# Patient Record
Sex: FEMALE | Race: WHITE | NOT HISPANIC OR LATINO | URBAN - METROPOLITAN AREA
[De-identification: names, ages, dates, MRNs, and addresses within clinical notes are randomized per-mention and may not be internally consistent; named-entity substitution may affect disease eponyms.]

---

## 2024-04-03 ENCOUNTER — TELEPHONE (OUTPATIENT)
Dept: HEPATOLOGY | Facility: CLINIC | Age: 74
End: 2024-04-03

## 2024-04-03 NOTE — TELEPHONE ENCOUNTER
Contacted patient regarding external referral received from MARLEEN Coleman re: alcoholic cirrhosis of liver.     Patient declined scheduling an appointment at this time. She states that this is not something she is ready to move forward with.     Explained to patient reason provider felt it was important for her to be seen by hepatology and patient declined. Patient states that she will call me when ready to proceed with scheduling.     Referring provider made aware.

## 2025-04-30 ENCOUNTER — TELEPHONE (OUTPATIENT)
Dept: HEPATOLOGY | Facility: CLINIC | Age: 75
End: 2025-04-30
Payer: MEDICARE

## 2025-04-30 NOTE — TELEPHONE ENCOUNTER
----- Message from JACKIE Flores sent at 4/29/2025  7:20 PM CDT -----  Contact: [phone]  Is Dr. Sherman able to accommodate this patient for next week?  ----- Message -----  From: Casie Jennings  Sent: 4/29/2025   3:39 PM CDT  To: [facility] Hepatology Coordinator    Patient is returning a phone call.Who left a message for the patient: Sandra Tapia RNDoes patient know what this is regarding:  Re external referral entered from Chance Montanez NP for cirrhosis of the Liver stage four. Provider is asking pt be seen in the next week. She has been having dark urine, itching , fatigue, no appetite, unintentionally lost 15 lbs, ER visit 3's in the last week, and jaundiceWould you like a call back, or a response through your [facility] portal?:   callComments:  ----- Message -----  From: Casie Jennings  Sent: 4/29/2025   3:32 PM CDT  To: [facility] Hepatology Coordinator    Patient is returning a phone call.Who left a message for the patient: Sandra Tapia RNDoes patient know what this is regarding:  Re external referral entered from Chance Montanez NP for cirrhosis of the Liver. Provider is asking pt be seen in the next week. She has been having dark urine, itching , fatigue, unintentionally lost 15 lbs, ER visit 3's in the last week, and jaundiceWould you like a call back, or a response through your [facility] portal?:   callComments:

## 2025-04-30 NOTE — TELEPHONE ENCOUNTER
Called the patient and informed that we are working her into the schedule for next week and to please arrive at 745am.  The patient agreed with time, date and location of the appointment.

## 2025-05-05 PROBLEM — F10.90 ALCOHOL USE DISORDER: Status: ACTIVE | Noted: 2025-05-05

## 2025-05-05 PROBLEM — R17 JAUNDICE: Status: ACTIVE | Noted: 2025-05-05

## 2025-05-05 PROBLEM — K70.30 ALCOHOLIC CIRRHOSIS OF LIVER WITHOUT ASCITES: Status: ACTIVE | Noted: 2025-05-05

## 2025-05-05 PROBLEM — D69.6 THROMBOCYTOPENIA: Status: ACTIVE | Noted: 2025-05-05

## 2025-05-05 NOTE — PROGRESS NOTES
Hepatology Note    PATIENT: Lorena Wells  MRN: [id number]  DATE: 5/7/2025    Provider: Hepatologist - Dr Sherman  Urgency of review: non-urgent  Referring provider: Aaareferral Self    Diagnosis:   1. Jaundice    2. Metabolic dysfunction-associated steatotic liver disease (MASLD)    3. Liver fibrosis    4. Portal hypertension    5. Sarcopenia    6. Abnormal findings on diagnostic imaging of heart and coronary circulation        Chief complaint:   Chief Complaint   Patient presents with    Cirrhosis       Subjective:    Initial History: Lorena Wells is a 74 y.o. female who was referred to Hepatology Clinic for consultation of MASLD related cirrhosis       Diagnosed with cirrhosis: diagnosed 2024 decompensated yes               Ascites: lasix 20, spironolactone 50 daily; no LVP or SBP.                HE: no              GI bleeding: no Beta blockers: yes              Jaundice: none     Cirrhosis HCM:  HCC screening: Imaging 2025 No Liver lesion,  Variceal screening: EGD 2025 no varices with Dr Anton  Dexa scan:no  Hepatitis A/B Immune Status: no      05/07/2025   Only complain is itching  She was Admitted to  [facility] 4/15-4/16 for worsening jaundice, She Had inpatient US and CT scan --which showed cirrhosis without ascites   Today   She denies recent hematemesis, coffee ground emesis, melena, hematochezia, confusion, LE edema, abdominal distension.     H/o dog bite to hand in February, s/p surgical I&D and completed course of Augmentin     Etiological workup for viral ( Hepatitis A/B/C), autoimmune, genetic liver disease ( Kj, A1AT etc) is negative       Prior Relevant History:    She  denies hepatotoxic medication    Review of systems:  A review of 12+ systems was conducted with pertinent positive and negative findings documented in HPI with all other systems reviewed and negative.      PFSH:  Past medical, family, and social history reviewed as documented in chart with pertinent positive medical,  family, and social history detailed in HPI.    Past Medical History: History reviewed. No pertinent past medical history.    Past Surgical HIstory: History reviewed. No pertinent surgical history.    Family History: No family history on file.  She has no known family history of liver disease.     Social History:  reports that she has never smoked. She has never been exposed to tobacco smoke. She has never used smokeless tobacco. She reports that she does not drink alcohol and does not use drugs.    She has significant history of Alcohol     She denies history of IV drug use/Tatto  She  denies high-risk sexual contacts, no raw seafood, no sick contacts      Allergies:  Review of patient's allergies indicates:   Allergen Reactions    Atarax [hydroxyzine hcl] Other (See Comments)     Severe drowsiness    Pollen,fermented Other (See Comments)     headaches       Medications:  Current Medications[1]    Review of Systems   Constitutional:  Negative for fatigue, fever and unexpected weight change.   HENT:  Negative for ear pain, nosebleeds and trouble swallowing.    Eyes:  Negative for discharge and redness.   Respiratory:  Negative for cough and shortness of breath.    Cardiovascular:  Negative for palpitations and leg swelling.   Gastrointestinal:  Negative for abdominal distention, abdominal pain, diarrhea and vomiting.   Endocrine: Negative for cold intolerance and polyuria.   Genitourinary:  Negative for flank pain and hematuria.   Musculoskeletal:  Negative for back pain.   Skin:  Negative for pallor.   Neurological:  Negative for seizures and headaches.   Hematological:  Does not bruise/bleed easily.   Psychiatric/Behavioral:  Negative for confusion and hallucinations.        Computed MELD 3.0 unavailable. One or more values for this score either were not found within the given timeframe or did not fit some other criterion.  Computed MELD-Na unavailable. One or more values for this score either were not found within  "the given timeframe or did not fit some other criterion.       Objective:      Vitals:   Vitals:    05/07/25 0803   BP: 116/62   BP Location: Right arm   Patient Position: Sitting   Pulse: 63   SpO2: 96%   Weight: 81.4 kg (179 lb 5.5 oz)   Height: 5' 6" (1.676 m)       Physical Exam  Constitutional:       Appearance: Normal appearance.   HENT:      Head: Normocephalic and atraumatic.      Right Ear: Tympanic membrane and external ear normal.      Left Ear: Tympanic membrane and external ear normal.      Mouth/Throat:      Mouth: Mucous membranes are moist.   Eyes:      Extraocular Movements: Extraocular movements intact.      Pupils: Pupils are equal, round, and reactive to light.   Cardiovascular:      Rate and Rhythm: Normal rate and regular rhythm.      Pulses: Normal pulses.      Heart sounds: Normal heart sounds.   Pulmonary:      Effort: Pulmonary effort is normal.      Breath sounds: Normal breath sounds.   Abdominal:      General: Bowel sounds are normal. There is no distension.      Palpations: Abdomen is soft. There is no mass.      Tenderness: There is no abdominal tenderness.   Musculoskeletal:         General: No swelling or deformity. Normal range of motion.      Cervical back: Normal range of motion and neck supple.   Skin:     Coloration: Skin is not jaundiced.   Neurological:      General: No focal deficit present.      Mental Status: She is alert and oriented to person, place, and time.      Cranial Nerves: No cranial nerve deficit.   Psychiatric:         Mood and Affect: Mood normal.         Behavior: Behavior normal.         Laboratory Data:  No visits with results within 1 Week(s) from this visit.   Latest known visit with results is:   No results found for any previous visit.       Lab Results   Component Value Date    INR 1 04/17/2025    INR 1.1 04/16/2025    INR 1 04/15/2025       No results found for: "SMOOTHMUSCAB", "MITOAB"  Lab Results   Component Value Date    IRON 93 04/16/2025    TIBC " "477 04/16/2025    FERRITIN 56.2 04/16/2025     Lab Results   Component Value Date    HEPCAB Non Reactive 01/09/2024     Lab Results   Component Value Date    TSH 1.81 02/17/2025     No results found for: "GOPI"    No results found for: "ABORH"        Lab Results   Component Value Date    HGBA1C 6.7 (H) 02/17/2025     Lab Results   Component Value Date    CHOL 135 02/17/2025    CHOL 152 01/17/2024    CHOL 135 08/16/2023     Lab Results   Component Value Date    HDL 36 (L) 02/17/2025    HDL 35 (L) 01/17/2024    HDL 34 (L) 08/16/2023     Lab Results   Component Value Date    LDLCALC 68 02/17/2025    LDLCALC 73 01/17/2024    LDLCALC 65 08/16/2023     Lab Results   Component Value Date    TRIG 184 (H) 02/17/2025    TRIG 274 (H) 01/17/2024    TRIG 179 (H) 08/16/2023     No results found for: "CHOLHDL"      I personally reviewed imaging studies and outside records..      Assessment:       1. Jaundice    2. Metabolic dysfunction-associated steatotic liver disease (MASLD)    3. Liver fibrosis    4. Portal hypertension    5. Sarcopenia    6. Abnormal findings on diagnostic imaging of heart and coronary circulation                 Plan:     Problem List Items Addressed This Visit          GI    Jaundice - Primary    Relevant Orders    Hepatitis B Surface Antibody, Qual/Quant    Hepatitis A antibody, IgG    Tissue Transglutaminase, IgA    Antimitochondrial Antibody    Miscellaneous Test, Sendout Mal Liver    CBC Auto Differential    AFP Tumor Marker    Comprehensive Metabolic Panel    US Abdomen Limited    Protime-INR    Liver fibrosis    Metabolic dysfunction-associated steatotic liver disease (MASLD)    Portal hypertension    Relevant Orders    Hepatitis B Surface Antibody, Qual/Quant    Hepatitis A antibody, IgG    Tissue Transglutaminase, IgA    Antimitochondrial Antibody    Miscellaneous Test, Sendout Mal Liver    CBC Auto Differential    AFP Tumor Marker    Comprehensive Metabolic Panel    US Abdomen Limited    " "Protime-INR       Orthopedic    Sarcopenia     Other Visit Diagnoses         Abnormal findings on diagnostic imaging of heart and coronary circulation        Relevant Orders    AFP Tumor Marker            Lorena Fitzpatrick" was seen today for cirrhosis.    Diagnoses and all orders for this visit:    Jaundice  -     Hepatitis B Surface Antibody, Qual/Quant; Future  -     Hepatitis A antibody, IgG; Future  -     Tissue Transglutaminase, IgA; Future  -     Antimitochondrial Antibody; Future  -     Miscellaneous Test, Sendout Mal Liver; Future  -     CBC Auto Differential; Standing  -     AFP Tumor Marker; Standing  -     Comprehensive Metabolic Panel; Standing  -     US Abdomen Limited; Standing  -     Protime-INR; Standing    Metabolic dysfunction-associated steatotic liver disease (MASLD)    Liver fibrosis    Portal hypertension  -     Hepatitis B Surface Antibody, Qual/Quant; Future  -     Hepatitis A antibody, IgG; Future  -     Tissue Transglutaminase, IgA; Future  -     Antimitochondrial Antibody; Future  -     Miscellaneous Test, Sendout Mal Liver; Future  -     CBC Auto Differential; Standing  -     AFP Tumor Marker; Standing  -     Comprehensive Metabolic Panel; Standing  -     US Abdomen Limited; Standing  -     Protime-INR; Standing    Sarcopenia    Abnormal findings on diagnostic imaging of heart and coronary circulation  -     AFP Tumor Marker; Standing    Other orders  -     cholestyramine (QUESTRAN) 4 gram packet; Take 1 packet (4 g total) by mouth 3 (three) times daily with meals.      Cirrhosis  MASLD  Well compensated clinically  Monitor the MELD score  At present plan to monitor the MELD and decompensating event  Variceal surveillance--EGD with GI with Dr Anton in 1 -2 year  HCC surveillance--Imaging and AFP every 6 month  Dexa scan locally  Check Hepatitis A/B immune status and Immunization with PCP  if not immune    Itching  Started on Cholestyramine  Low dose of Atarax  Dermatology " opinion    DM  Follow up PCP      Return to clinic in 6 months.    I have sent communication to the referring physician and/or primary care provider.      Time Statement  A total time spent includes time preparing to see patient, reviewing  diagnostic studies and records, direct face-to-face visit, completing orders, medications , reconciliation, prescription management, and care coordination    We discussed in depth the nature of the patient's disease, the management plan in details. I have provided the patient with an opportunity to ask questions and have all questions answered to his satisfaction.     Discussed with patient that it is likely that she will see results before Myself or my nurse are able to view them and report results due to the Cures Act passed 4/1/21. Results will be sent immediately to the patient who are enrolled in the patient portal. If results come through after business hours or on weekend, we will not see them until the next business day that we are in the office. If resulted during the business day, we will likely not be able to review them until after completing all patient visits in office that day.       Larry Sherman MD  Transplant Hepatologist and Gastroenterologist  [facility]         [1]   Current Outpatient Medications   Medication Sig Dispense Refill    calcium citrate (CALCITRATE) 200 mg (950 mg) tablet Take 1 tablet by mouth every morning.      diclofenac sodium (VOLTAREN) 1 % Gel Apply 2 g topically 4 (four) times daily as needed.      furosemide (LASIX) 20 MG tablet Take 20 mg by mouth once daily.      JARDIANCE 10 mg tablet Take 10 mg by mouth once daily.      lisinopriL 10 MG tablet Take 10 mg by mouth once daily.      metoprolol tartrate (LOPRESSOR) 50 MG tablet Take 50 mg by mouth 2 (two) times daily.      omeprazole (PRILOSEC OTC) 20 MG tablet Take 20 mg by mouth every evening.      pioglitazone (ACTOS) 15 MG  tablet Take 15 mg by mouth every morning.      spironolactone (ALDACTONE) 50 MG tablet Take 50 mg by mouth once daily.      VITAMIN B COMPLEX ORAL Take 1 tablet by mouth every morning.      cholestyramine (QUESTRAN) 4 gram packet Take 1 packet (4 g total) by mouth 3 (three) times daily with meals. 270 packet 3     No current facility-administered medications for this visit.

## 2025-05-07 ENCOUNTER — OFFICE VISIT (OUTPATIENT)
Dept: HEPATOLOGY | Facility: CLINIC | Age: 75
End: 2025-05-07
Payer: MEDICARE

## 2025-05-07 ENCOUNTER — LAB VISIT (OUTPATIENT)
Dept: LAB | Facility: HOSPITAL | Age: 75
End: 2025-05-07
Attending: INTERNAL MEDICINE
Payer: MEDICARE

## 2025-05-07 VITALS
SYSTOLIC BLOOD PRESSURE: 116 MMHG | WEIGHT: 179.38 LBS | DIASTOLIC BLOOD PRESSURE: 62 MMHG | HEIGHT: 66 IN | HEART RATE: 63 BPM | OXYGEN SATURATION: 96 % | BODY MASS INDEX: 28.83 KG/M2

## 2025-05-07 DIAGNOSIS — R17 JAUNDICE: Primary | ICD-10-CM

## 2025-05-07 DIAGNOSIS — R93.1 ABNORMAL FINDINGS ON DIAGNOSTIC IMAGING OF HEART AND CORONARY CIRCULATION: ICD-10-CM

## 2025-05-07 DIAGNOSIS — K74.00 LIVER FIBROSIS: ICD-10-CM

## 2025-05-07 DIAGNOSIS — K76.0 METABOLIC DYSFUNCTION-ASSOCIATED STEATOTIC LIVER DISEASE (MASLD): ICD-10-CM

## 2025-05-07 DIAGNOSIS — K76.6 PORTAL HYPERTENSION: ICD-10-CM

## 2025-05-07 DIAGNOSIS — R17 JAUNDICE: ICD-10-CM

## 2025-05-07 DIAGNOSIS — M62.84 SARCOPENIA: ICD-10-CM

## 2025-05-07 LAB
ABSOLUTE EOSINOPHIL (OHS): 0.28 K/UL
ABSOLUTE MONOCYTE (OHS): 0.58 K/UL (ref 0.3–1)
ABSOLUTE NEUTROPHIL COUNT (OHS): 4.82 K/UL (ref 1.8–7.7)
AFP SERPL-MCNC: 4.2 NG/ML
ALBUMIN SERPL BCP-MCNC: 4 G/DL (ref 3.5–5.2)
ALP SERPL-CCNC: 314 UNIT/L (ref 40–150)
ALT SERPL W/O P-5'-P-CCNC: 46 UNIT/L (ref 10–44)
ANION GAP (OHS): 13 MMOL/L (ref 8–16)
AST SERPL-CCNC: 68 UNIT/L (ref 11–45)
BASOPHILS # BLD AUTO: 0.16 K/UL
BASOPHILS NFR BLD AUTO: 2.1 %
BILIRUB SERPL-MCNC: 6.9 MG/DL (ref 0.1–1)
BUN SERPL-MCNC: 27 MG/DL (ref 8–23)
CALCIUM SERPL-MCNC: 10.9 MG/DL (ref 8.7–10.5)
CHLORIDE SERPL-SCNC: 99 MMOL/L (ref 95–110)
CO2 SERPL-SCNC: 24 MMOL/L (ref 23–29)
CREAT SERPL-MCNC: 1.3 MG/DL (ref 0.5–1.4)
ERYTHROCYTE [DISTWIDTH] IN BLOOD BY AUTOMATED COUNT: 16.9 % (ref 11.5–14.5)
GFR SERPLBLD CREATININE-BSD FMLA CKD-EPI: 43 ML/MIN/1.73/M2
GLUCOSE SERPL-MCNC: 144 MG/DL (ref 70–110)
HAV AB SER QL IA: NORMAL
HCT VFR BLD AUTO: 43.5 % (ref 37–48.5)
HGB BLD-MCNC: 13.5 GM/DL (ref 12–16)
IMM GRANULOCYTES # BLD AUTO: 0.01 K/UL (ref 0–0.04)
IMM GRANULOCYTES NFR BLD AUTO: 0.1 % (ref 0–0.5)
INR PPP: 1 (ref 0.8–1.2)
LYMPHOCYTES # BLD AUTO: 1.91 K/UL (ref 1–4.8)
MCH RBC QN AUTO: 28.2 PG (ref 27–31)
MCHC RBC AUTO-ENTMCNC: 31 G/DL (ref 32–36)
MCV RBC AUTO: 91 FL (ref 82–98)
NUCLEATED RBC (/100WBC) (OHS): 0 /100 WBC
PLATELET # BLD AUTO: 296 K/UL (ref 150–450)
PMV BLD AUTO: 12.4 FL (ref 9.2–12.9)
POTASSIUM SERPL-SCNC: 3.9 MMOL/L (ref 3.5–5.1)
PROT SERPL-MCNC: 8.3 GM/DL (ref 6–8.4)
PROTHROMBIN TIME: 11.1 SECONDS (ref 9–12.5)
RBC # BLD AUTO: 4.79 M/UL (ref 4–5.4)
RELATIVE EOSINOPHIL (OHS): 3.6 %
RELATIVE LYMPHOCYTE (OHS): 24.6 % (ref 18–48)
RELATIVE MONOCYTE (OHS): 7.5 % (ref 4–15)
RELATIVE NEUTROPHIL (OHS): 62.1 % (ref 38–73)
SODIUM SERPL-SCNC: 136 MMOL/L (ref 136–145)
WBC # BLD AUTO: 7.76 K/UL (ref 3.9–12.7)

## 2025-05-07 PROCEDURE — 85610 PROTHROMBIN TIME: CPT

## 2025-05-07 PROCEDURE — 99205 OFFICE O/P NEW HI 60 MIN: CPT | Mod: S$PBB,,, | Performed by: INTERNAL MEDICINE

## 2025-05-07 PROCEDURE — 86381 MITOCHONDRIAL ANTIBODY EACH: CPT

## 2025-05-07 PROCEDURE — 86790 VIRUS ANTIBODY NOS: CPT

## 2025-05-07 PROCEDURE — 85025 COMPLETE CBC W/AUTO DIFF WBC: CPT

## 2025-05-07 PROCEDURE — 99999 PR PBB SHADOW E&M-EST. PATIENT-LVL IV: CPT | Mod: PBBFAC,,, | Performed by: INTERNAL MEDICINE

## 2025-05-07 PROCEDURE — 86364 TISS TRNSGLTMNASE EA IG CLAS: CPT

## 2025-05-07 PROCEDURE — 36415 COLL VENOUS BLD VENIPUNCTURE: CPT

## 2025-05-07 PROCEDURE — 80053 COMPREHEN METABOLIC PANEL: CPT

## 2025-05-07 PROCEDURE — 86706 HEP B SURFACE ANTIBODY: CPT

## 2025-05-07 PROCEDURE — 99214 OFFICE O/P EST MOD 30 MIN: CPT | Mod: PBBFAC | Performed by: INTERNAL MEDICINE

## 2025-05-07 PROCEDURE — 82105 ALPHA-FETOPROTEIN SERUM: CPT

## 2025-05-07 RX ORDER — IBUPROFEN 200 MG
1 CAPSULE ORAL EVERY MORNING
COMMUNITY

## 2025-05-07 RX ORDER — METOPROLOL TARTRATE 50 MG/1
50 TABLET ORAL 2 TIMES DAILY
COMMUNITY
Start: 2025-02-25

## 2025-05-07 RX ORDER — DICLOFENAC SODIUM 10 MG/G
2 GEL TOPICAL 4 TIMES DAILY PRN
COMMUNITY
Start: 2024-07-19

## 2025-05-07 RX ORDER — LISINOPRIL 10 MG/1
10 TABLET ORAL DAILY
COMMUNITY

## 2025-05-07 RX ORDER — PIOGLITAZONE 15 MG/1
15 TABLET ORAL EVERY MORNING
COMMUNITY
Start: 2025-02-10

## 2025-05-07 RX ORDER — SPIRONOLACTONE 50 MG/1
50 TABLET, FILM COATED ORAL DAILY
COMMUNITY
Start: 2025-04-17 | End: 2025-05-17

## 2025-05-07 RX ORDER — EMPAGLIFLOZIN 10 MG/1
10 TABLET, FILM COATED ORAL DAILY
COMMUNITY
Start: 2025-04-16 | End: 2025-07-15

## 2025-05-07 RX ORDER — CHOLESTYRAMINE 4 G/9G
4 POWDER, FOR SUSPENSION ORAL
Qty: 270 PACKET | Refills: 3 | Status: SHIPPED | OUTPATIENT
Start: 2025-05-07 | End: 2026-05-07

## 2025-05-07 RX ORDER — OMEPRAZOLE 20 MG/1
20 TABLET, DELAYED RELEASE ORAL NIGHTLY
COMMUNITY

## 2025-05-07 RX ORDER — FUROSEMIDE 20 MG/1
20 TABLET ORAL DAILY
COMMUNITY
Start: 2025-04-17 | End: 2025-05-17

## 2025-05-07 NOTE — PATIENT INSTRUCTIONS
Because you have cirrhosis, it is important to attend regular clinic visits  with an Ultrasound and blood tests every 6 months to screen for liver cancer (you are at risk of developing liver cancer due to scar tissue in the liver)     Signs and symptoms of worsening liver disease include jaundice, fluid in the belly (ascites), and confusion/disorientation/slowed thought processes due to hepatic encephalopathy (toxins building up because of liver problems).   You should seek medical attention if any of these things occur.    Also, possible bleeding from esophageal varices (blood vessels in the stomach and foodpipe can burst and cause fatal bleeding).  Therefore, if you have symptoms of vomiting blood, blood in your stool, dark or black stools or vomiting coffee ground vomit, YOU SHOULD GO TO THE EMERGENCY ROOM IMMEDIATELY.      Cirrhosis can increase the risk of liver cancer, liver failure, and death. However, we will watch your liver function score (MELD score) closely with each clinic visit. A normal MELD score is 6, highest is 40. We will check this with every clinic visit. A MELD 15 or higher is when we start to consider transplant because MELD 15 or higher indicates that the liver is not functioning as well      [email or web address]  Please visit the website for for information on Liver disease      Cirrhosis Counseling  - strict abstinence of alcohol use  - avoid non-steroidal anti-inflammatory drugs (NSAIDs) such as ibuprofen, Motrin, naprosyn, Alleve due to the risk of kidney damage  - can take acetaminophen (Tylenol), no more than 2000 mg per day  - low sodium (salt) 2 gram per day diet  - nutrition: 25-30kcal (calorie per body body weight in kilogram) per day  - no need to restrict protein in diet  - high protein diet: 1.2-1.5 gram/kg (protein per body weight in kilogram) per day to prevent muscle mass loss  - avoid fasting  - Late night snack with 50 gram of complex carbohydrates and  protein  - resistance exercises for muscle strength  - avoid raw seafoods due to the risk of fatal Vibrio vulnificus infection  - compliance with lactulose and/or  rifaximin if you have developed hepatic encephalopathy   -I recommend you do NOT drive a car or operate machinery currently considering risk of Hepatic encephalopathy  - ultrasound or imaging of the liver every 6 months for liver cancer screening (you are at risk of developing liver cancer due to scar tissue in the liver)  - Upper endoscopy surveillance to screen for varices in the stomach and foodpipe which can burst and cause fatal bleeding      Thanks for trusting us with your healthcare needs and using [facility]. If you want to ask us a question, you can do so by replying to this message or by calling [phone].

## 2025-05-08 ENCOUNTER — RESULTS FOLLOW-UP (OUTPATIENT)
Dept: HEPATOLOGY | Facility: HOSPITAL | Age: 75
End: 2025-05-08

## 2025-05-09 LAB — MITOCHONDRIA AB TITR SER IF: NORMAL {TITER}

## 2025-05-10 LAB
W HEPATITIS B SURFACE ANTIBODY, QUALITATIVE: POSITIVE
W HEPATITIS B SURFACE ANTIBODY, QUANTITATIVE: 24 MIU/ML

## 2025-05-12 LAB — W TISSUE TRANSGLUTAMINASE IGA AB: 0.7 U/ML

## 2025-05-28 DIAGNOSIS — K76.9 LIVER DISEASE, UNSPECIFIED: Primary | ICD-10-CM

## 2025-05-29 ENCOUNTER — PATIENT MESSAGE (OUTPATIENT)
Dept: HEPATOLOGY | Facility: CLINIC | Age: 75
End: 2025-05-29
Payer: MEDICARE

## 2025-06-02 ENCOUNTER — LAB VISIT (OUTPATIENT)
Dept: LAB | Facility: HOSPITAL | Age: 75
End: 2025-06-02
Attending: INTERNAL MEDICINE
Payer: MEDICARE

## 2025-06-02 DIAGNOSIS — R17 JAUNDICE: ICD-10-CM

## 2025-06-02 DIAGNOSIS — R93.1 ABNORMAL FINDINGS ON DIAGNOSTIC IMAGING OF HEART AND CORONARY CIRCULATION: ICD-10-CM

## 2025-06-02 DIAGNOSIS — K76.6 PORTAL HYPERTENSION: ICD-10-CM

## 2025-06-02 LAB
ABSOLUTE EOSINOPHIL (OHS): 0.29 K/UL
ABSOLUTE MONOCYTE (OHS): 0.67 K/UL (ref 0.3–1)
ABSOLUTE NEUTROPHIL COUNT (OHS): 3.82 K/UL (ref 1.8–7.7)
AFP SERPL-MCNC: 2.7 NG/ML
ALBUMIN SERPL BCP-MCNC: 4.2 G/DL (ref 3.5–5.2)
ALP SERPL-CCNC: 211 UNIT/L (ref 40–150)
ALT SERPL W/O P-5'-P-CCNC: 64 UNIT/L (ref 10–44)
ANION GAP (OHS): 12 MMOL/L (ref 8–16)
AST SERPL-CCNC: 45 UNIT/L (ref 11–45)
BASOPHILS # BLD AUTO: 0.09 K/UL
BASOPHILS NFR BLD AUTO: 1.4 %
BILIRUB SERPL-MCNC: 1.7 MG/DL (ref 0.1–1)
BUN SERPL-MCNC: 26 MG/DL (ref 8–23)
CALCIUM SERPL-MCNC: 10 MG/DL (ref 8.7–10.5)
CHLORIDE SERPL-SCNC: 105 MMOL/L (ref 95–110)
CO2 SERPL-SCNC: 22 MMOL/L (ref 23–29)
CREAT SERPL-MCNC: 1.1 MG/DL (ref 0.5–1.4)
ERYTHROCYTE [DISTWIDTH] IN BLOOD BY AUTOMATED COUNT: 16.3 % (ref 11.5–14.5)
GFR SERPLBLD CREATININE-BSD FMLA CKD-EPI: 53 ML/MIN/1.73/M2
GLUCOSE SERPL-MCNC: 154 MG/DL (ref 70–110)
HCT VFR BLD AUTO: 35.2 % (ref 37–48.5)
HGB BLD-MCNC: 11.3 GM/DL (ref 12–16)
IMM GRANULOCYTES # BLD AUTO: 0.02 K/UL (ref 0–0.04)
IMM GRANULOCYTES NFR BLD AUTO: 0.3 % (ref 0–0.5)
INR PPP: 1 (ref 0.8–1.2)
LYMPHOCYTES # BLD AUTO: 1.49 K/UL (ref 1–4.8)
MCH RBC QN AUTO: 28.5 PG (ref 27–31)
MCHC RBC AUTO-ENTMCNC: 32.1 G/DL (ref 32–36)
MCV RBC AUTO: 89 FL (ref 82–98)
NUCLEATED RBC (/100WBC) (OHS): 0 /100 WBC
PLATELET # BLD AUTO: 213 K/UL (ref 150–450)
PMV BLD AUTO: 12.2 FL (ref 9.2–12.9)
POTASSIUM SERPL-SCNC: 4.6 MMOL/L (ref 3.5–5.1)
PROT SERPL-MCNC: 7.3 GM/DL (ref 6–8.4)
PROTHROMBIN TIME: 11.3 SECONDS (ref 9–12.5)
RBC # BLD AUTO: 3.97 M/UL (ref 4–5.4)
RELATIVE EOSINOPHIL (OHS): 4.5 %
RELATIVE LYMPHOCYTE (OHS): 23.4 % (ref 18–48)
RELATIVE MONOCYTE (OHS): 10.5 % (ref 4–15)
RELATIVE NEUTROPHIL (OHS): 59.9 % (ref 38–73)
SODIUM SERPL-SCNC: 139 MMOL/L (ref 136–145)
WBC # BLD AUTO: 6.38 K/UL (ref 3.9–12.7)

## 2025-06-02 PROCEDURE — 85610 PROTHROMBIN TIME: CPT

## 2025-06-02 PROCEDURE — 80053 COMPREHEN METABOLIC PANEL: CPT

## 2025-06-02 PROCEDURE — 36415 COLL VENOUS BLD VENIPUNCTURE: CPT | Mod: PN

## 2025-06-02 PROCEDURE — 82105 ALPHA-FETOPROTEIN SERUM: CPT

## 2025-06-02 PROCEDURE — 85025 COMPLETE CBC W/AUTO DIFF WBC: CPT

## 2025-06-03 ENCOUNTER — RESULTS FOLLOW-UP (OUTPATIENT)
Dept: HEPATOLOGY | Facility: CLINIC | Age: 75
End: 2025-06-03

## 2025-06-06 ENCOUNTER — PATIENT MESSAGE (OUTPATIENT)
Dept: HEPATOLOGY | Facility: CLINIC | Age: 75
End: 2025-06-06
Payer: MEDICARE

## 2025-06-09 ENCOUNTER — HOSPITAL ENCOUNTER (OUTPATIENT)
Dept: RADIOLOGY | Facility: HOSPITAL | Age: 75
Discharge: HOME | End: 2025-06-09
Attending: INTERNAL MEDICINE
Payer: MEDICARE

## 2025-06-09 DIAGNOSIS — K76.6 PORTAL HYPERTENSION: ICD-10-CM

## 2025-06-09 DIAGNOSIS — R17 JAUNDICE: ICD-10-CM

## 2025-06-09 PROCEDURE — 76705 ECHO EXAM OF ABDOMEN: CPT | Mod: 26,,, | Performed by: RADIOLOGY

## 2025-06-09 PROCEDURE — 76705 ECHO EXAM OF ABDOMEN: CPT | Mod: TC,PN

## 2025-07-20 ENCOUNTER — PATIENT MESSAGE (OUTPATIENT)
Dept: HEPATOLOGY | Facility: CLINIC | Age: 75
End: 2025-07-20
Payer: MEDICARE